# Patient Record
Sex: FEMALE | Race: WHITE
[De-identification: names, ages, dates, MRNs, and addresses within clinical notes are randomized per-mention and may not be internally consistent; named-entity substitution may affect disease eponyms.]

---

## 2020-11-24 ENCOUNTER — HOSPITAL ENCOUNTER (INPATIENT)
Dept: HOSPITAL 93 - ER | Age: 30
LOS: 1 days | Discharge: HOME | DRG: 392 | End: 2020-11-25
Attending: INTERNAL MEDICINE | Admitting: INTERNAL MEDICINE
Payer: COMMERCIAL

## 2020-11-24 VITALS — BODY MASS INDEX: 31.89 KG/M2 | HEIGHT: 63 IN | WEIGHT: 180 LBS

## 2020-11-24 DIAGNOSIS — Z20.828: ICD-10-CM

## 2020-11-24 DIAGNOSIS — A08.8: Primary | ICD-10-CM

## 2020-11-24 PROCEDURE — 8E0ZXY6 ISOLATION: ICD-10-PCS | Performed by: INTERNAL MEDICINE

## 2020-11-24 PROCEDURE — BW40ZZZ ULTRASONOGRAPHY OF ABDOMEN: ICD-10-PCS | Performed by: INTERNAL MEDICINE

## 2020-11-24 NOTE — NUR
PACIENTE FEMINA ALERTA ORIENTADA DEVIDAMENTE IDENTIFICADA. SE RE ORIENTA SOBRE
EL TRATAMIENTO ORDENADO POR EL MEDICO DE TURNO LA MISMA REFIERE ENTENDER. AREA
DE VENOPUNCION PATENTE ELLI DE EDEMA Y ERITEMA AL MOMENTO. PENDIENTE
RESULTADOS DE LABORATORIO Y SONOGRAMA ABDOMINAL. PACIENTE ESTABLE DENTRO DE CARR
CONDICION DE CRISTOFER. SE BIRNDA PRIVACIDAD SEGURIDAD EN TODO MOMENTO.

## 2020-11-24 NOTE — NUR
PTE ALERTA Y ORIENTADO POR MIK DIMENSIONES OCN BUEN PATRON RESPIARTORIO. SE
ORIENTA SOBRE TRATAMIENTO A SEGUIR,  MEDICAMENTOS E INSTRUCCIONES, PTE REFIERE
ENTENDER. SE ADMINISTRA MEDICAMENTOS, SE EXTRAE MUESTRAS Y SE CANALIZA MILES
ORDEN MEDICA, UTILIZANDO MEDIDAS ASEPTICAS. AREA DE CANALZIACION PERMANECE
PATENTE, ELLI DE EDMA Y ERITEMA. SE MANTIENE BAJO OBSERVACION POR CAMBIOS.